# Patient Record
Sex: FEMALE | Race: WHITE | Employment: PART TIME | URBAN - METROPOLITAN AREA
[De-identification: names, ages, dates, MRNs, and addresses within clinical notes are randomized per-mention and may not be internally consistent; named-entity substitution may affect disease eponyms.]

---

## 2022-02-14 ENCOUNTER — OFFICE VISIT (OUTPATIENT)
Dept: FAMILY MEDICINE CLINIC | Age: 32
End: 2022-02-14
Payer: MEDICAID

## 2022-02-14 ENCOUNTER — HOSPITAL ENCOUNTER (OUTPATIENT)
Dept: LAB | Age: 32
Discharge: HOME | End: 2022-02-14

## 2022-02-14 VITALS
BODY MASS INDEX: 24.92 KG/M2 | SYSTOLIC BLOOD PRESSURE: 112 MMHG | HEART RATE: 72 BPM | TEMPERATURE: 98 F | OXYGEN SATURATION: 100 % | HEIGHT: 61 IN | WEIGHT: 132 LBS | RESPIRATION RATE: 10 BRPM | DIASTOLIC BLOOD PRESSURE: 70 MMHG

## 2022-02-14 DIAGNOSIS — F32.89 OTHER DEPRESSION: Primary | ICD-10-CM

## 2022-02-14 DIAGNOSIS — Z83.3 FAMILY HISTORY OF DIABETES MELLITUS: ICD-10-CM

## 2022-02-14 DIAGNOSIS — R53.83 OTHER FATIGUE: ICD-10-CM

## 2022-02-14 LAB — XX-LABCORP SPECIMEN COL,LCBCF: NORMAL

## 2022-02-14 PROCEDURE — 99001 SPECIMEN HANDLING PT-LAB: CPT

## 2022-02-14 PROCEDURE — 99203 OFFICE O/P NEW LOW 30 MIN: CPT | Performed by: FAMILY MEDICINE

## 2022-02-14 NOTE — PROGRESS NOTES
Chief Complaint   Patient presents with    Depression    Hormone Problem    Gland Swelling     Pt in office to establish care. Having depression and anxious thoughts. Would like hormone levels checked. Concerned about PMDD. C/o swelling of tonsils. Would like to also get a referral to psychology for mental health assessment.

## 2022-02-14 NOTE — PATIENT INSTRUCTIONS
Thyroid Hormone Tests: About This Test  What are thyroid hormone tests? Thyroid hormone tests are blood tests that check how well your thyroid gland is working. The thyroid gland is a butterfly-shaped gland that lies in front of your windpipe (trachea), just below your voice box (larynx). The thyroid gland makes hormones that control the way your body uses energy (metabolism). This test will give your doctor information about your thyroid hormone levels. You may have hyperthyroidism when your thyroid gland makes too much thyroid hormone. You may have hypothyroidism when the gland does not make enough thyroid hormone. Why are these tests done? Thyroid hormone tests are done to find the cause of an abnormal thyroid-stimulating hormone (TSH) test. TSH tests can also check how well treatment for thyroid disease is working. They are used in newborns to find out if the thyroid gland is working as it should. How do you prepare for the test?  If you are taking thyroid medicines, tell your doctor when you took your last dose. You may need to stop taking thyroid medicines for a short time before having this test.  How is the test done? Blood test  A health professional uses a needle to take a blood sample, usually from the arm. Heel stick  A heel stick is used to get a blood sample from a baby. The baby's heel is poked, and several drops of blood are collected. The baby may have a tiny bruise where the heel was poked. What happens after the test?  · You will probably be able to go home right away. · You can go back to your usual activities right away. When should you call for help? Watch closely for changes in your health, and be sure to contact your doctor if you have any questions about this test.  Follow-up care is a key part of your treatment and safety. Be sure to make and go to all appointments, and call your doctor if you are having problems.  It's also a good idea to keep a list of the medicines you take. Ask your doctor when you can expect to have your test results. Where can you learn more? Go to http://www.gray.com/  Enter H865 in the search box to learn more about \"Thyroid Hormone Tests: About This Test.\"  Current as of: December 2, 2020               Content Version: 13.0  © 2006-2021 Healthwise, [employer]. Care instructions adapted under license by [employer] (which disclaims liability or warranty for this information). If you have questions about a medical condition or this instruction, always ask your healthcare professional. [address] any warranty or liability for your use of this information.

## 2022-02-14 NOTE — PROGRESS NOTES
Elvin Villegas is a 27 y.o. female  presents for establish care. She has fatigue and depression. No ideas of suicide or homicide. sxs are not getting worse. Allergies   Allergen Reactions    Pcn [Penicillins] Rash and Itching       There is no problem list on file for this patient. Past Medical History:   Diagnosis Date    Body dysmorphic disorder     Constipation     Ovarian cyst      Social History     Socioeconomic History    Marital status:    Tobacco Use    Smoking status: Never Smoker    Smokeless tobacco: Never Used   Substance and Sexual Activity    Alcohol use: No    Drug use: Never     No family history on file. Review of Systems   Constitutional: Negative for chills, fever, malaise/fatigue and weight loss. Eyes: Negative for blurred vision. Respiratory: Negative for cough, shortness of breath and wheezing. Cardiovascular: Negative for chest pain. Gastrointestinal: Negative for nausea and vomiting. Musculoskeletal: Negative for myalgias. Skin: Negative for rash. Neurological: Negative for weakness. Psychiatric/Behavioral: Positive for depression. Negative for hallucinations, memory loss, substance abuse and suicidal ideas. The patient is not nervous/anxious and does not have insomnia. Vitals:    02/14/22 1321   BP: 112/70   Pulse: 72   Resp: 10   Temp: 98 °F (36.7 °C)   SpO2: 100%   Weight: 132 lb (59.9 kg)   Height: 5' 1\" (1.549 m)   PainSc:   0 - No pain       Physical Exam  Vitals and nursing note reviewed. Constitutional:       Appearance: Normal appearance. She is normal weight. Neck:      Thyroid: No thyromegaly. Cardiovascular:      Rate and Rhythm: Normal rate and regular rhythm. Heart sounds: Normal heart sounds. Pulmonary:      Effort: Pulmonary effort is normal.      Breath sounds: Normal breath sounds. Abdominal:      General: Abdomen is flat. Bowel sounds are normal.      Palpations: Abdomen is soft.    Musculoskeletal: General: Normal range of motion. Cervical back: Normal range of motion and neck supple. Skin:     General: Skin is warm and dry. Capillary Refill: Capillary refill takes less than 2 seconds. Neurological:      General: No focal deficit present. Mental Status: She is alert and oriented to person, place, and time. Psychiatric:         Mood and Affect: Mood normal.         Behavior: Behavior normal.         Thought Content: Thought content normal.         Judgment: Judgment normal.         Assessment/Plan      ICD-10-CM ICD-9-CM    1. Other depression  F32.89 311 CBC WITH AUTOMATED DIFF   2. Other fatigue  R53.83 780.79 FSH AND LH      TSH AND FREE T4   3. Family history of diabetes mellitus  V40.5 I52.6 METABOLIC PANEL, COMPREHENSIVE     I have discussed the diagnosis with the patient and the intended plan of care as seen in the above orders. The patient has received an after-visit summary and questions were answered concerning future plans. I have discussed medication, side effects, and warnings with the patient in detail. The patient verbalized understanding and is in agreement with the plan of care. The patient will contact the office with any additional concerns.         lab results and schedule of future lab studies reviewed with patient    Ky Kline MD

## 2022-02-15 LAB
ALBUMIN SERPL-MCNC: 4.7 G/DL (ref 3.8–4.8)
ALBUMIN/GLOB SERPL: 1.8 {RATIO} (ref 1.2–2.2)
ALP SERPL-CCNC: 51 IU/L (ref 44–121)
ALT SERPL-CCNC: 16 IU/L (ref 0–32)
AST SERPL-CCNC: 19 IU/L (ref 0–40)
BASOPHILS # BLD AUTO: 0.1 X10E3/UL (ref 0–0.2)
BASOPHILS NFR BLD AUTO: 1 %
BILIRUB SERPL-MCNC: 1.1 MG/DL (ref 0–1.2)
BUN SERPL-MCNC: 9 MG/DL (ref 6–20)
BUN/CREAT SERPL: 13 (ref 9–23)
CALCIUM SERPL-MCNC: 9.6 MG/DL (ref 8.7–10.2)
CHLORIDE SERPL-SCNC: 101 MMOL/L (ref 96–106)
CO2 SERPL-SCNC: 21 MMOL/L (ref 20–29)
CREAT SERPL-MCNC: 0.7 MG/DL (ref 0.57–1)
EOSINOPHIL # BLD AUTO: 0.1 X10E3/UL (ref 0–0.4)
EOSINOPHIL NFR BLD AUTO: 1 %
ERYTHROCYTE [DISTWIDTH] IN BLOOD BY AUTOMATED COUNT: 12.3 % (ref 11.7–15.4)
FSH SERPL-ACNC: 7.2 MIU/ML
GLOBULIN SER CALC-MCNC: 2.6 G/DL (ref 1.5–4.5)
GLUCOSE SERPL-MCNC: 87 MG/DL (ref 65–99)
HCT VFR BLD AUTO: 43.6 % (ref 34–46.6)
HGB BLD-MCNC: 14.6 G/DL (ref 11.1–15.9)
IMM GRANULOCYTES # BLD AUTO: 0 X10E3/UL (ref 0–0.1)
IMM GRANULOCYTES NFR BLD AUTO: 0 %
LH SERPL-ACNC: 43.8 MIU/ML
LYMPHOCYTES # BLD AUTO: 2.2 X10E3/UL (ref 0.7–3.1)
LYMPHOCYTES NFR BLD AUTO: 32 %
MCH RBC QN AUTO: 32.6 PG (ref 26.6–33)
MCHC RBC AUTO-ENTMCNC: 33.5 G/DL (ref 31.5–35.7)
MCV RBC AUTO: 97 FL (ref 79–97)
MONOCYTES # BLD AUTO: 0.4 X10E3/UL (ref 0.1–0.9)
MONOCYTES NFR BLD AUTO: 6 %
NEUTROPHILS # BLD AUTO: 4 X10E3/UL (ref 1.4–7)
NEUTROPHILS NFR BLD AUTO: 60 %
PLATELET # BLD AUTO: 216 X10E3/UL (ref 150–450)
POTASSIUM SERPL-SCNC: 4.1 MMOL/L (ref 3.5–5.2)
PROT SERPL-MCNC: 7.3 G/DL (ref 6–8.5)
RBC # BLD AUTO: 4.48 X10E6/UL (ref 3.77–5.28)
SODIUM SERPL-SCNC: 138 MMOL/L (ref 134–144)
T4 FREE SERPL-MCNC: 1.31 NG/DL (ref 0.82–1.77)
TSH SERPL DL<=0.005 MIU/L-ACNC: 2.9 UIU/ML (ref 0.45–4.5)
WBC # BLD AUTO: 6.7 X10E3/UL (ref 3.4–10.8)

## 2022-02-17 ENCOUNTER — HOSPITAL ENCOUNTER (EMERGENCY)
Age: 32
Discharge: HOME | End: 2022-02-17
Attending: EMERGENCY MEDICINE
Payer: MEDICAID

## 2022-02-17 VITALS
HEART RATE: 64 BPM | OXYGEN SATURATION: 97 % | WEIGHT: 127 LBS | SYSTOLIC BLOOD PRESSURE: 114 MMHG | TEMPERATURE: 98.5 F | DIASTOLIC BLOOD PRESSURE: 75 MMHG | BODY MASS INDEX: 25.6 KG/M2 | RESPIRATION RATE: 16 BRPM | HEIGHT: 59 IN

## 2022-02-17 DIAGNOSIS — R10.13 ABDOMINAL PAIN, EPIGASTRIC: Primary | ICD-10-CM

## 2022-02-17 LAB
APPEARANCE UR: NORMAL
BILIRUB UR QL: NEGATIVE
COLOR UR: YELLOW
GLUCOSE UR STRIP.AUTO-MCNC: NEGATIVE MG/DL
HCG UR QL: NEGATIVE
HGB UR QL STRIP: NEGATIVE
KETONES UR QL STRIP.AUTO: NEGATIVE MG/DL
LEUKOCYTE ESTERASE UR QL STRIP.AUTO: NEGATIVE
NITRITE UR QL STRIP.AUTO: NEGATIVE
PH UR STRIP: 7 [PH] (ref 5–8)
PROT UR STRIP-MCNC: NEGATIVE MG/DL
SP GR UR REFRACTOMETRY: 1.02 (ref 1–1.03)
UROBILINOGEN UR QL STRIP.AUTO: 1 EU/DL (ref 0.2–1)

## 2022-02-17 PROCEDURE — 81003 URINALYSIS AUTO W/O SCOPE: CPT

## 2022-02-17 PROCEDURE — 99283 EMERGENCY DEPT VISIT LOW MDM: CPT

## 2022-02-17 PROCEDURE — 81025 URINE PREGNANCY TEST: CPT

## 2022-02-17 RX ORDER — PHENOL/SODIUM PHENOLATE
20 AEROSOL, SPRAY (ML) MUCOUS MEMBRANE 2 TIMES DAILY
Qty: 28 TABLET | Refills: 0 | Status: SHIPPED | OUTPATIENT
Start: 2022-02-17 | End: 2022-03-03

## 2022-02-17 NOTE — ED PROVIDER NOTES
EMERGENCY DEPARTMENT HISTORY AND PHYSICAL EXAM    Date: 2/17/2022  Patient Name: Nicole Velasquez    History of Presenting Illness     Chief Complaint   Patient presents with    Back Pain    Epigastric Pain    Gas         History Provided By: Patient      Additional History (Context): Nicole Velasquez is a 26-year-old female G2, P2 with history of ovarian cysts and chronic constipation who presents to the ER with complaints of lower back pain that started about 2 weeks ago and she denies any injury or trauma. She states that she is also been having epigastric pain worse after eating and feels like she is belching more than normal.  She has had gastritis in the past but has not taken any medicine over-the-counter. No nausea, vomiting, weight loss, melena, blood in the stool, fever, chills, vaginal discharge or vaginal bleeding. Normal menstrual cycles. She does take herbal laxatives daily and feels like she is still having bowel movements. She did see her doctor for this 3 days ago and had lab work done that was normal.    PCP: Other, MD Fariba    Current Outpatient Medications   Medication Sig Dispense Refill    sennosides (SENNA HERBAL LAXATIVE PO) Take  by mouth.  Omeprazole delayed release (PRILOSEC D/R) 20 mg tablet Take 1 Tablet by mouth two (2) times a day for 14 days. 28 Tablet 0       Past History     Past Medical History:  Past Medical History:   Diagnosis Date    Acute mastitis of right breast     Body dysmorphic disorder     Constipation     Hx of eating disorder     Laxative abuse     Ovarian cyst     Positive PPD, treated        Past Surgical History:  History reviewed. No pertinent surgical history. Family History:  History reviewed. No pertinent family history.     Social History:  Social History     Tobacco Use    Smoking status: Never Smoker    Smokeless tobacco: Never Used   Substance Use Topics    Alcohol use: Yes     Comment: occ    Drug use: Never Allergies: Allergies   Allergen Reactions    Pcn [Penicillins] Rash and Itching         Review of Systems     Review of Systems   Constitutional: Negative for chills and fever. HENT: Negative for nasal congestion, sore throat, rhinorrhea  Eyes: Negative. Respiratory: negative  cough and negative for shortness of breath. Cardiovascular: Negative for chest pain and palpitations. Gastrointestinal: Positive for epigastric abdominal pain and constipation. Negative for diarrhea,  nausea and vomiting. Genitourinary: Negative for difficulty urinating, hematuria, and flank pain. Musculoskeletal: Negative for back pain. Negative for gait problem and neck pain. Skin: Negative for rash. Allergic/Immunologic: Negative. Neurological: Negative for dizziness, weakness, numbness and headaches. Psychiatric/Behavioral: Negative. All other systems reviewed and are negative. All Other Systems Negative  Physical Exam     Vitals:    02/17/22 1310   BP: 114/75   Pulse: 64   Resp: 16   Temp: 98.5 °F (36.9 °C)   SpO2: 97%   Weight: 57.6 kg (127 lb)   Height: 4' 11\" (1.499 m)     Physical Exam  Vitals and nursing note reviewed. Constitutional:       General: She is not in acute distress. Appearance: Normal appearance. She is not ill-appearing, toxic-appearing or diaphoretic. HENT:      Head: Normocephalic and atraumatic. Nose: Nose normal.      Mouth/Throat:      Mouth: Mucous membranes are moist.      Pharynx: Oropharynx is clear. Eyes:      General: Lids are normal. Vision grossly intact. Conjunctiva/sclera: Conjunctivae normal.   Cardiovascular:      Rate and Rhythm: Normal rate and regular rhythm. Pulses: Normal pulses. Heart sounds: Normal heart sounds. Pulmonary:      Effort: Pulmonary effort is normal. No respiratory distress. Breath sounds: Normal breath sounds. No stridor. No wheezing, rhonchi or rales. Chest:      Chest wall: No tenderness.    Abdominal: General: There is no distension. Palpations: Abdomen is soft. Tenderness: There is no abdominal tenderness. There is no right CVA tenderness, left CVA tenderness, guarding or rebound. Musculoskeletal:         General: Normal range of motion. Cervical back: Full passive range of motion without pain, normal range of motion and neck supple. No tenderness. Lymphadenopathy:      Cervical: No cervical adenopathy. Skin:     General: Skin is warm and dry. Capillary Refill: Capillary refill takes less than 2 seconds. Neurological:      General: No focal deficit present. Mental Status: She is alert and oriented to person, place, and time. Psychiatric:         Mood and Affect: Mood normal.         Behavior: Behavior normal. Behavior is cooperative. Diagnostic Study Results     Labs -     Recent Results (from the past 12 hour(s))   URINALYSIS W/ RFLX MICROSCOPIC    Collection Time: 02/17/22  1:11 PM   Result Value Ref Range    Color YELLOW      Appearance CLOUDY      Specific gravity 1.016 1.005 - 1.030      pH (UA) 7.0 5.0 - 8.0      Protein Negative NEG mg/dL    Glucose Negative NEG mg/dL    Ketone Negative NEG mg/dL    Bilirubin Negative NEG      Blood Negative NEG      Urobilinogen 1.0 0.2 - 1.0 EU/dL    Nitrites Negative NEG      Leukocyte Esterase Negative NEG     HCG URINE, QL    Collection Time: 02/17/22  1:11 PM   Result Value Ref Range    HCG urine, QL Negative NEG         Radiologic Studies -   No orders to display     CT Results  (Last 48 hours)    None        CXR Results  (Last 48 hours)    None            Medical Decision Making   I am the first provider for this patient. I reviewed the vital signs, available nursing notes, past medical history, past surgical history, family history and social history. Vital Signs-Reviewed the patient's vital signs.         Records Reviewed: Nursing notes, old medical records and any previous labs, imaging, visits, consultations pertinent to patient care    Procedures:  Procedures      ED Course: Progress Notes, Reevaluation, and Consults:  4:24 PM  Initial assessment performed. The patients presenting problems have been discussed, and they/their family are in agreement with the care plan formulated and outlined with them. I have encouraged them to ask questions as they arise throughout their visit. Provider Notes (Medical Decision Making):   Patient presents ambulatory in no acute distress, well-hydrated, non-toxic in appearance, with normal vitals. Benign exam of abdomen with no tenderness on exam and no peritoneal signs. Subjective pain in the epigastric region. Negative Leonard sign    Will obtain appropriate studies to evaluate patient's complaints and treat symptomatically. Will disposition after reassessment assuming no clinical change or worsening and appropriate response to symptomatic treatment. Unremarkable labs from 2/14/2022 by PCP and urine hCG today were negative. Tolerating PO well. Patient appears well and comfortable. Doubt acute surgical process. Repeat abdominal exam reveals soft and non-tender abdomen. No new symptoms on re-evaluation. Patient has no pain on palpation. We will treat her with PPI and have her follow closely with PCP and will also give GI referral.  I do not feel that any additional emergent imaging is warranted at this time. Will discharge home with supportive treatment and close follow-up with PCP in 2-3 days. GI referral given. MED RECONCILIATION:  No current facility-administered medications for this encounter. Current Outpatient Medications   Medication Sig    sennosides (SENNA HERBAL LAXATIVE PO) Take  by mouth.  Omeprazole delayed release (PRILOSEC D/R) 20 mg tablet Take 1 Tablet by mouth two (2) times a day for 14 days. Disposition:  Home in stable condition. DISCHARGE NOTE:     Patient has been reexamined.   Patient has no new complaints, changes, or physical findings. Patient demonstrates understanding of current diagnoses and is in agreement with the treatment plan. They are advised that while the likelihood of serious underlying condition is low at this point given the evaluation performed today, we cannot fully rule it out. They are advised to immediately return with any new symptoms or worsening of current condition. Care plan outlined and precautions discussed. Discussed proper way to take medications. Medication use, risk/benefit, side effects and precautions discussed in detail. Discussed treatment plan, return precautions, symptomatic relief, and expected time to improvement. All questions answered. Patient is stable for discharge and outpatient management. Patient is ready to go home. Follow-up Information     Follow up With Specialties Details Why Contact Info    [facility]  Schedule an appointment as soon as possible for a visit  Follow-up from the Emergency Department [place] [address]  [phone] [facility] EMERGENCY DEPT Emergency Medicine  As needed, If symptoms worsen [address] [facility]  [phone]          Current Discharge Medication List      START taking these medications    Details   Omeprazole delayed release (PRILOSEC D/R) 20 mg tablet Take 1 Tablet by mouth two (2) times a day for 14 days. Qty: 28 Tablet, Refills: 0  Start date: 2/17/2022, End date: 3/3/2022                   Diagnosis     Clinical Impression:   1. Abdominal pain, epigastric        Dictation disclaimer:  Please note that this dictation was completed with [employer]OM[employer]O, the computer voice recognition software. Quite often unanticipated grammatical, syntax, homophones, and other interpretive errors are inadvertently transcribed by the computer software. Please disregard these errors. Please excuse any errors that have escaped final proofreading.

## 2022-02-17 NOTE — ED TRIAGE NOTES
Patient c/o lower back pain that began on 2/5/22. Denies urinary issues. C/o intermittent epigastric pain that began four days ago. She states having pelvic pain. C/o abdominal distention and gas.

## 2022-02-28 ENCOUNTER — PATIENT MESSAGE (OUTPATIENT)
Dept: FAMILY MEDICINE CLINIC | Age: 32
End: 2022-02-28

## 2022-03-03 ENCOUNTER — TELEPHONE (OUTPATIENT)
Dept: FAMILY MEDICINE CLINIC | Age: 32
End: 2022-03-03

## 2022-03-03 DIAGNOSIS — R10.10 PAIN OF UPPER ABDOMEN: Primary | ICD-10-CM

## 2022-03-03 NOTE — TELEPHONE ENCOUNTER
----- Message from Kayla Salamanca sent at 3/2/2022  3:25 PM EST -----  Subject: Referral Request    QUESTIONS   Reason for referral request? Patient would like a referral to a   Gastroenterologist to get tested for H. Pylori she was seen in the ER for   an Ulcer. Has the physician seen you for this condition before? No   Preferred Specialist (if applicable)? Do you already have an appointment scheduled? No  Additional Information for Provider?   ---------------------------------------------------------------------------  --------------  CALL BACK INFO  What is the best way for the office to contact you? OK to leave message on   voicemail  Preferred Call Back Phone Number?  [phone]

## 2022-09-09 ENCOUNTER — TELEPHONE (OUTPATIENT)
Dept: FAMILY MEDICINE CLINIC | Age: 32
End: 2022-09-09

## 2022-09-09 DIAGNOSIS — R10.10 PAIN OF UPPER ABDOMEN: Primary | ICD-10-CM

## 2022-09-09 NOTE — TELEPHONE ENCOUNTER
Patient called requesting  a referral to [address] [place] [address]  for pain of upper abdomen, on 03/04/22 a referral was submitted, however Dr. Lionel Banuelos is not in network with [facility]. She is requesting a new referral to be sent to [facility] [phone] fax: [phone]. Please review and advise as soon as possible.

## 2022-09-12 NOTE — TELEPHONE ENCOUNTER
Pt is aware that referral was sent to [facility]. Advised pt to give their office a call if she hasn't heard from them in a few days. Pt expressed clear understanding and had no further questions.